# Patient Record
Sex: MALE | Race: WHITE | Employment: FULL TIME | URBAN - METROPOLITAN AREA
[De-identification: names, ages, dates, MRNs, and addresses within clinical notes are randomized per-mention and may not be internally consistent; named-entity substitution may affect disease eponyms.]

---

## 2018-03-12 ENCOUNTER — OFFICE VISIT (OUTPATIENT)
Dept: FAMILY MEDICINE CLINIC | Facility: CLINIC | Age: 28
End: 2018-03-12

## 2018-03-12 VITALS
DIASTOLIC BLOOD PRESSURE: 92 MMHG | SYSTOLIC BLOOD PRESSURE: 153 MMHG | WEIGHT: 205 LBS | HEART RATE: 83 BPM | BODY MASS INDEX: 30 KG/M2

## 2018-03-12 DIAGNOSIS — S89.91XA INJURY OF RIGHT KNEE, INITIAL ENCOUNTER: Primary | ICD-10-CM

## 2018-03-12 PROCEDURE — 99212 OFFICE O/P EST SF 10 MIN: CPT | Performed by: FAMILY MEDICINE

## 2018-03-12 PROCEDURE — 99214 OFFICE O/P EST MOD 30 MIN: CPT | Performed by: FAMILY MEDICINE

## 2018-03-12 RX ORDER — IBUPROFEN 600 MG/1
600 TABLET ORAL EVERY 6 HOURS PRN
Qty: 30 TABLET | Refills: 1 | Status: ON HOLD | OUTPATIENT
Start: 2018-03-12 | End: 2018-05-01

## 2018-03-12 NOTE — PROGRESS NOTES
HPI:    Patient ID: Vijay Ceron is a 34year old male. HPI  Patient presents with: Injury: c/o  right knee injury, pt injured knee playing football yesterday, pt c/o pain and limited movement  No prior injury.   Review of Systems   Musculoskeleta

## 2018-03-14 ENCOUNTER — TELEPHONE (OUTPATIENT)
Dept: FAMILY MEDICINE CLINIC | Facility: CLINIC | Age: 28
End: 2018-03-14

## 2018-03-14 NOTE — TELEPHONE ENCOUNTER
Griselda/MRI Dept/LOM requesting to confirm if pt's knee MRI is with or without contrast    Pt has appt 3/16

## 2018-03-16 ENCOUNTER — HOSPITAL ENCOUNTER (OUTPATIENT)
Dept: MRI IMAGING | Age: 28
Discharge: HOME | End: 2018-03-16
Attending: FAMILY MEDICINE
Payer: COMMERCIAL

## 2018-03-16 DIAGNOSIS — S89.91XA INJURY OF RIGHT KNEE, INITIAL ENCOUNTER: ICD-10-CM

## 2018-03-16 PROCEDURE — 73721 MRI JNT OF LWR EXTRE W/O DYE: CPT | Performed by: FAMILY MEDICINE

## 2018-03-19 ENCOUNTER — OFFICE VISIT (OUTPATIENT)
Dept: ORTHOPEDICS CLINIC | Facility: CLINIC | Age: 28
End: 2018-03-19

## 2018-03-19 DIAGNOSIS — S83.231A COMPLEX TEAR OF MEDIAL MENISCUS OF RIGHT KNEE AS CURRENT INJURY, INITIAL ENCOUNTER: ICD-10-CM

## 2018-03-19 DIAGNOSIS — S83.511A COMPLETE TEAR OF ANTERIOR CRUCIATE LIGAMENT OF RIGHT KNEE, INITIAL ENCOUNTER: Primary | ICD-10-CM

## 2018-03-19 PROCEDURE — 99212 OFFICE O/P EST SF 10 MIN: CPT | Performed by: ORTHOPAEDIC SURGERY

## 2018-03-19 PROCEDURE — 99243 OFF/OP CNSLTJ NEW/EST LOW 30: CPT | Performed by: ORTHOPAEDIC SURGERY

## 2018-03-19 NOTE — H&P
Chief Complaint: Right knee pain    NURSING INTAKE COMMENTS: Patient presents with:   Injury: Right - onset 3/11/18 while playing football his knee gave out sideways and he fell - went home and iced it - wass seen by PCP and has an MRI in the system - still Medial None +++      Lateral None None      Pes anserinus None None      Patellar tendon None None        Kassie Negative Negative             Stability Testing        Anterior Drawer Negative lax      Posterior Drawer Negative Negative      Lachman Neg the following: Anesthetic risk, bleeding, infection, blood clots, pulmonary embolus, knee pain, knee stiffness, and need for further surgery because of retearing. I also discussed the rehabilitation that would be necessary afterwards.   All the patient's q

## 2018-04-18 ENCOUNTER — TELEPHONE (OUTPATIENT)
Dept: ORTHOPEDICS CLINIC | Facility: CLINIC | Age: 28
End: 2018-04-18

## 2018-04-18 NOTE — TELEPHONE ENCOUNTER
Pt for surgery with Dr. Justin [facility]er[facility]   Out patient  May 1 2018  Right knee ACL reconstruction    [id number]  S83.511A    [employer] O    Referrral in system  REferral status authorized.

## 2018-05-01 ENCOUNTER — ANESTHESIA EVENT (OUTPATIENT)
Dept: SURGERY | Facility: HOSPITAL | Age: 28
End: 2018-05-01

## 2018-05-01 ENCOUNTER — SURGERY (OUTPATIENT)
Age: 28
End: 2018-05-01

## 2018-05-01 ENCOUNTER — HOSPITAL ENCOUNTER (OUTPATIENT)
Facility: HOSPITAL | Age: 28
Setting detail: HOSPITAL OUTPATIENT SURGERY
Discharge: HOME | End: 2018-05-01
Attending: ORTHOPAEDIC SURGERY | Admitting: ORTHOPAEDIC SURGERY
Payer: COMMERCIAL

## 2018-05-01 ENCOUNTER — ANESTHESIA (OUTPATIENT)
Dept: SURGERY | Facility: HOSPITAL | Age: 28
End: 2018-05-01

## 2018-05-01 VITALS
HEART RATE: 79 BPM | HEIGHT: 70 IN | OXYGEN SATURATION: 99 % | SYSTOLIC BLOOD PRESSURE: 147 MMHG | BODY MASS INDEX: 28.63 KG/M2 | WEIGHT: 200 LBS | RESPIRATION RATE: 16 BRPM | TEMPERATURE: 98 F | DIASTOLIC BLOOD PRESSURE: 81 MMHG

## 2018-05-01 DIAGNOSIS — S83.281A ACUTE LATERAL MENISCUS TEAR OF RIGHT KNEE, INITIAL ENCOUNTER: Primary | ICD-10-CM

## 2018-05-01 DIAGNOSIS — S83.231A COMPLEX TEAR OF MEDIAL MENISCUS OF RIGHT KNEE AS CURRENT INJURY, INITIAL ENCOUNTER: ICD-10-CM

## 2018-05-01 DIAGNOSIS — S83.511A SPRAIN OF ANTERIOR CRUCIATE LIGAMENT OF RIGHT KNEE, INITIAL ENCOUNTER: ICD-10-CM

## 2018-05-01 PROCEDURE — 76942 ECHO GUIDE FOR BIOPSY: CPT | Performed by: ORTHOPAEDIC SURGERY

## 2018-05-01 PROCEDURE — A4216 STERILE WATER/SALINE, 10 ML: HCPCS

## 2018-05-01 PROCEDURE — 0SBC4ZZ EXCISION OF RIGHT KNEE JOINT, PERCUTANEOUS ENDOSCOPIC APPROACH: ICD-10-PCS | Performed by: ORTHOPAEDIC SURGERY

## 2018-05-01 PROCEDURE — 64447 NJX AA&/STRD FEMORAL NRV IMG: CPT | Performed by: ORTHOPAEDIC SURGERY

## 2018-05-01 PROCEDURE — 3E0T3BZ INTRODUCTION OF ANESTHETIC AGENT INTO PERIPHERAL NERVES AND PLEXI, PERCUTANEOUS APPROACH: ICD-10-PCS | Performed by: ANESTHESIOLOGY

## 2018-05-01 PROCEDURE — 99152 MOD SED SAME PHYS/QHP 5/>YRS: CPT | Performed by: ORTHOPAEDIC SURGERY

## 2018-05-01 PROCEDURE — 0MRN4KZ REPLACEMENT OF RIGHT KNEE BURSA AND LIGAMENT WITH NONAUTOLOGOUS TISSUE SUBSTITUTE, PERCUTANEOUS ENDOSCOPIC APPROACH: ICD-10-PCS | Performed by: ORTHOPAEDIC SURGERY

## 2018-05-01 DEVICE — SCREW INTFR 28MM 9MM RND SHLDR: Type: IMPLANTABLE DEVICE | Site: KNEE | Status: FUNCTIONAL

## 2018-05-01 DEVICE — GRAFT ANTERIOR TIBIALIS: Type: IMPLANTABLE DEVICE | Site: KNEE | Status: FUNCTIONAL

## 2018-05-01 RX ORDER — METOCLOPRAMIDE 10 MG/1
10 TABLET ORAL ONCE
Status: DISCONTINUED | OUTPATIENT
Start: 2018-05-01 | End: 2018-05-01 | Stop reason: HOSPADM

## 2018-05-01 RX ORDER — NALOXONE HYDROCHLORIDE 0.4 MG/ML
80 INJECTION, SOLUTION INTRAMUSCULAR; INTRAVENOUS; SUBCUTANEOUS AS NEEDED
Status: DISCONTINUED | OUTPATIENT
Start: 2018-05-01 | End: 2018-05-01

## 2018-05-01 RX ORDER — CEFAZOLIN SODIUM/WATER 2 G/20 ML
2 SYRINGE (ML) INTRAVENOUS ONCE
Status: DISCONTINUED | OUTPATIENT
Start: 2018-05-01 | End: 2018-05-01 | Stop reason: HOSPADM

## 2018-05-01 RX ORDER — ROPIVACAINE HYDROCHLORIDE 5 MG/ML
INJECTION, SOLUTION EPIDURAL; INFILTRATION; PERINEURAL AS NEEDED
Status: DISCONTINUED | OUTPATIENT
Start: 2018-05-01 | End: 2018-05-01 | Stop reason: SURG

## 2018-05-01 RX ORDER — HYDROMORPHONE HYDROCHLORIDE 1 MG/ML
0.4 INJECTION, SOLUTION INTRAMUSCULAR; INTRAVENOUS; SUBCUTANEOUS EVERY 5 MIN PRN
Status: DISCONTINUED | OUTPATIENT
Start: 2018-05-01 | End: 2018-05-01

## 2018-05-01 RX ORDER — OXYCODONE AND ACETAMINOPHEN 10; 325 MG/1; MG/1
1-2 TABLET ORAL EVERY 6 HOURS PRN
Qty: 40 TABLET | Refills: 0 | Status: SHIPPED | OUTPATIENT
Start: 2018-05-01 | End: 2018-06-27 | Stop reason: ALTCHOICE

## 2018-05-01 RX ORDER — HALOPERIDOL 5 MG/ML
0.25 INJECTION INTRAMUSCULAR ONCE AS NEEDED
Status: DISCONTINUED | OUTPATIENT
Start: 2018-05-01 | End: 2018-05-01

## 2018-05-01 RX ORDER — ONDANSETRON 2 MG/ML
4 INJECTION INTRAMUSCULAR; INTRAVENOUS ONCE AS NEEDED
Status: COMPLETED | OUTPATIENT
Start: 2018-05-01 | End: 2018-05-01

## 2018-05-01 RX ORDER — ONDANSETRON 2 MG/ML
INJECTION INTRAMUSCULAR; INTRAVENOUS AS NEEDED
Status: DISCONTINUED | OUTPATIENT
Start: 2018-05-01 | End: 2018-05-01 | Stop reason: SURG

## 2018-05-01 RX ORDER — HYDROMORPHONE HYDROCHLORIDE 1 MG/ML
0.2 INJECTION, SOLUTION INTRAMUSCULAR; INTRAVENOUS; SUBCUTANEOUS EVERY 5 MIN PRN
Status: DISCONTINUED | OUTPATIENT
Start: 2018-05-01 | End: 2018-05-01

## 2018-05-01 RX ORDER — SODIUM CHLORIDE, SODIUM LACTATE, POTASSIUM CHLORIDE, CALCIUM CHLORIDE 600; 310; 30; 20 MG/100ML; MG/100ML; MG/100ML; MG/100ML
INJECTION, SOLUTION INTRAVENOUS CONTINUOUS
Status: DISCONTINUED | OUTPATIENT
Start: 2018-05-01 | End: 2018-05-01

## 2018-05-01 RX ORDER — ASPIRIN 325 MG
325 TABLET ORAL DAILY
Qty: 14 TABLET | Refills: 0 | Status: SHIPPED | OUTPATIENT
Start: 2018-05-01 | End: 2018-06-27 | Stop reason: ALTCHOICE

## 2018-05-01 RX ORDER — FAMOTIDINE 20 MG/1
20 TABLET ORAL ONCE
Status: DISCONTINUED | OUTPATIENT
Start: 2018-05-01 | End: 2018-05-01 | Stop reason: HOSPADM

## 2018-05-01 RX ORDER — CEFAZOLIN SODIUM/WATER 2 G/20 ML
SYRINGE (ML) INTRAVENOUS AS NEEDED
Status: DISCONTINUED | OUTPATIENT
Start: 2018-05-01 | End: 2018-05-01 | Stop reason: SURG

## 2018-05-01 RX ORDER — HYDROCODONE BITARTRATE AND ACETAMINOPHEN 5; 325 MG/1; MG/1
1 TABLET ORAL AS NEEDED
Status: DISCONTINUED | OUTPATIENT
Start: 2018-05-01 | End: 2018-05-01

## 2018-05-01 RX ORDER — LIDOCAINE HYDROCHLORIDE 10 MG/ML
INJECTION, SOLUTION EPIDURAL; INFILTRATION; INTRACAUDAL; PERINEURAL AS NEEDED
Status: DISCONTINUED | OUTPATIENT
Start: 2018-05-01 | End: 2018-05-01 | Stop reason: SURG

## 2018-05-01 RX ORDER — BUPIVACAINE HYDROCHLORIDE AND EPINEPHRINE 5; 5 MG/ML; UG/ML
INJECTION, SOLUTION PERINEURAL AS NEEDED
Status: DISCONTINUED | OUTPATIENT
Start: 2018-05-01 | End: 2018-05-01 | Stop reason: HOSPADM

## 2018-05-01 RX ORDER — DEXAMETHASONE SODIUM PHOSPHATE 4 MG/ML
VIAL (ML) INJECTION AS NEEDED
Status: DISCONTINUED | OUTPATIENT
Start: 2018-05-01 | End: 2018-05-01 | Stop reason: SURG

## 2018-05-01 RX ORDER — HYDROMORPHONE HYDROCHLORIDE 1 MG/ML
0.6 INJECTION, SOLUTION INTRAMUSCULAR; INTRAVENOUS; SUBCUTANEOUS EVERY 5 MIN PRN
Status: DISCONTINUED | OUTPATIENT
Start: 2018-05-01 | End: 2018-05-01

## 2018-05-01 RX ORDER — ACETAMINOPHEN 500 MG
1000 TABLET ORAL ONCE
Status: COMPLETED | OUTPATIENT
Start: 2018-05-01 | End: 2018-05-01

## 2018-05-01 RX ORDER — HYDROCODONE BITARTRATE AND ACETAMINOPHEN 5; 325 MG/1; MG/1
2 TABLET ORAL AS NEEDED
Status: DISCONTINUED | OUTPATIENT
Start: 2018-05-01 | End: 2018-05-01

## 2018-05-01 RX ORDER — ONDANSETRON 2 MG/ML
4 INJECTION INTRAMUSCULAR; INTRAVENOUS EVERY 6 HOURS PRN
Status: CANCELLED | OUTPATIENT
Start: 2018-05-01

## 2018-05-01 RX ADMIN — SODIUM CHLORIDE, SODIUM LACTATE, POTASSIUM CHLORIDE, CALCIUM CHLORIDE: 600; 310; 30; 20 INJECTION, SOLUTION INTRAVENOUS at 14:51:00

## 2018-05-01 RX ADMIN — SODIUM CHLORIDE, SODIUM LACTATE, POTASSIUM CHLORIDE, CALCIUM CHLORIDE: 600; 310; 30; 20 INJECTION, SOLUTION INTRAVENOUS at 11:10:00

## 2018-05-01 RX ADMIN — ONDANSETRON 4 MG: 2 INJECTION INTRAMUSCULAR; INTRAVENOUS at 14:12:00

## 2018-05-01 RX ADMIN — CEFAZOLIN SODIUM/WATER 2 G: 2 G/20 ML SYRINGE (ML) INTRAVENOUS at 11:30:00

## 2018-05-01 RX ADMIN — DEXAMETHASONE SODIUM PHOSPHATE 4 MG: 4 MG/ML VIAL (ML) INJECTION at 11:33:00

## 2018-05-01 RX ADMIN — SODIUM CHLORIDE, SODIUM LACTATE, POTASSIUM CHLORIDE, CALCIUM CHLORIDE: 600; 310; 30; 20 INJECTION, SOLUTION INTRAVENOUS at 11:55:00

## 2018-05-01 RX ADMIN — SODIUM CHLORIDE, SODIUM LACTATE, POTASSIUM CHLORIDE, CALCIUM CHLORIDE: 600; 310; 30; 20 INJECTION, SOLUTION INTRAVENOUS at 13:40:00

## 2018-05-01 RX ADMIN — LIDOCAINE HYDROCHLORIDE 25 MG: 10 INJECTION, SOLUTION EPIDURAL; INFILTRATION; INTRACAUDAL; PERINEURAL at 11:15:00

## 2018-05-01 RX ADMIN — ROPIVACAINE HYDROCHLORIDE 30 ML: 5 INJECTION, SOLUTION EPIDURAL; INFILTRATION; PERINEURAL at 11:03:00

## 2018-05-01 NOTE — OPERATIVE REPORT
[facility]    PATIENT'S NAME: Fina MATA   ATTENDING PHYSICIAN: Kelsie Lawson MD   OPERATING PHYSICIAN: Kelsie Lawson MD   PATIENT ACCOUNT#:   [id number]    LOCATION:  [address]  MEDICAL RECORD #:   [id number]        Informed consent was obtained. PROCEDURE:  Patient was met in the preoperative holding area and the correct site was identified with his help. He was then brought back to the operating room after an adductor canal block was performed by Anesthesia.   He native femoral footprint of the ACL. We then activated the flipcutting device and retrodrilled a socket to approximately a depth of 25 mm.   In a similar fashion, through an anteromedial incision on the proximal tibia, we retrodrilled our tibial tunnel to for the skin. A sterile dressing was applied. He was placed in a knee immobilizer, awakened, taken to the recovery room in stable condition. FINDINGS:  Grossly torn anterior cruciate ligament.   Large radial tear of the posterior lateral meniscus adjac

## 2018-05-01 NOTE — ANESTHESIA PROCEDURE NOTES
Peripheral Block    Anesthesiologist:  Jackelyn Warner  Patient Location:  PACU  Start Time:  5/1/2018 11:02 AM  End Time:  5/1/2018 11:05 AM  Site Identification: ultrasound guided, real time ultrasound guided and IMAGE STORED AND RETRIEVABLE    Reason fo

## 2018-05-01 NOTE — H&P
Chief Complaint: Right knee pain, instability      History of present illness: This 34year old male presents with an ACL deficient right knee. He already had an MRI which showed an ACL tear that was obtained by Dr. Aki Paulino.   The pain is made worse with           0 Degrees Negative Negative      30 Degrees Negative Negative           Patellar apprehension Negative Negative   Patellofemoral pain Negative Negative                   Patellar Grind test Negative Negative   Hip Motion Normal Normal   Gait limp

## 2018-05-01 NOTE — BRIEF OP NOTE
Pre-Operative Diagnosis: Sprain of anterior cruciate ligament of right knee, initial encounter [S83.511A]  Complex tear of medial meniscus of right knee as current injury, initial encounter [O57.366P]     Post-Operative Diagnosis: Sprain of anterior crucia

## 2018-05-01 NOTE — ANESTHESIA POSTPROCEDURE EVALUATION
Patient: Dacia Musa    Procedure Summary     Date:  05/01/18 Room / Location:  [address] [place] [address] MAIN OR 04 / [address] [place] [address] MAIN OR    Anesthesia Start:  1110 Anesthesia Stop:  0984    Procedure:  KNEE ARTHROSCOPY ACL RECONSTRUCTION (Right Knee) Diagnosis:       Sprain of

## 2018-05-01 NOTE — ANESTHESIA PREPROCEDURE EVALUATION
Anesthesia PreOp Note    HPI:     Nuvia Chang is a 34year old male who presents for preoperative consultation requested by:  Christiane Aguilera MD    Date of Surgery: 5/1/2018    Procedure(s):  KNEE ARTHROSCOPY ACL RECONSTRUCTION  Indication: Spra 05/01/18 1120   EPINEPHrine HCl (ADRENALIN) 1 MG/ML injection   PRN Esvin Mace MD 1 mg at 05/01/18 1146   Lidocaine HCl (XYLOCAINE) 2 % jelly   PRN Lisa Mosley MD 1 Application at 46/41/08 1116     No current Epic-ordered outpatient prescrip Neuro/Psych - negative ROS     GI/Hepatic/Renal - negative ROS     Endo/Other - negative ROS   Abdominal              Anesthesia Plan:   ASA:  1  Plan:   General  Airway:  LMA  Post-op Pain Management: Interscalene block  Plan Comments: I have informed Alexander

## 2018-05-09 ENCOUNTER — OFFICE VISIT (OUTPATIENT)
Dept: PHYSICAL THERAPY | Age: 28
End: 2018-05-09
Attending: FAMILY MEDICINE
Payer: COMMERCIAL

## 2018-05-09 DIAGNOSIS — S83.511A SPRAIN OF ANTERIOR CRUCIATE LIGAMENT OF RIGHT KNEE, INITIAL ENCOUNTER: ICD-10-CM

## 2018-05-09 DIAGNOSIS — S83.281A ACUTE LATERAL MENISCUS TEAR OF RIGHT KNEE, INITIAL ENCOUNTER: ICD-10-CM

## 2018-05-09 PROCEDURE — 97162 PT EVAL MOD COMPLEX 30 MIN: CPT | Performed by: PHYSICAL THERAPIST

## 2018-05-09 PROCEDURE — 97110 THERAPEUTIC EXERCISES: CPT | Performed by: PHYSICAL THERAPIST

## 2018-05-09 NOTE — PROGRESS NOTES
KNEE EVALUATION:   Referring Physician:  Joanna  Diagnosis: Sprain of anterior cruciate ligament of right knee, initial encounter (I15.809V)  Acute lateral meniscus tear of right knee, initial encounter (Z65.731P)      Onset Date: 3/11/18  Sx Date: 5 impairments to return to walking in the community and going up and down stairs.     Precautions:  None     OBJECTIVE:   Gait: Slow antalgic gait with B axillary crutches knee brace locked into extension  Stairs step too gait with 1 rail and crutches    Stre plyometrics including skip/hop/jump on R SL to demo improved R knee stability and strength for age appropriate participation in sport/recreation.   6. Pt to increase R LE strength to 5/5 in order to perform squats without pain      Frequency / Duration: Prem Colon

## 2018-05-14 ENCOUNTER — OFFICE VISIT (OUTPATIENT)
Dept: ORTHOPEDICS CLINIC | Facility: CLINIC | Age: 28
End: 2018-05-14

## 2018-05-14 ENCOUNTER — OFFICE VISIT (OUTPATIENT)
Dept: PHYSICAL THERAPY | Age: 28
End: 2018-05-14
Attending: FAMILY MEDICINE
Payer: COMMERCIAL

## 2018-05-14 DIAGNOSIS — S83.511A RUPTURE OF ANTERIOR CRUCIATE LIGAMENT OF RIGHT KNEE, INITIAL ENCOUNTER: Primary | ICD-10-CM

## 2018-05-14 PROCEDURE — 99024 POSTOP FOLLOW-UP VISIT: CPT | Performed by: ORTHOPAEDIC SURGERY

## 2018-05-14 PROCEDURE — 97110 THERAPEUTIC EXERCISES: CPT | Performed by: PHYSICAL THERAPIST

## 2018-05-14 PROCEDURE — 99212 OFFICE O/P EST SF 10 MIN: CPT | Performed by: ORTHOPAEDIC SURGERY

## 2018-05-14 NOTE — PROGRESS NOTES
Dx: Sprain of anterior cruciate ligament of right knee, initial encounter (Q39.861P)  Acute lateral meniscus tear of right knee, initial encounter (Y05.298I)            Visit # 2  Fall Risk: standard     Scheduled Visits 8  Precautions: see protocol below sport/recreation.   6. Pt to increase R LE strength to 5/5 in order to perform squats without pain            Plan: Cont PT per plan/protocol, advance exercises per protocol next visit    Charges: 3 therex       Total Timed Treatment: 38 min  Total Treatmen obtain full (0 degrees) knee extension. 2. Increase knee range of motion. 3. Increase quadriceps strength in preparation for progression to  ambulation without use of crutches. Exercise Program:  1. Continue knee immobilizer at full extension.  Decrease returning to athletics is that you need three minutes of   biking to substitute for one minute of running. 7. Accelerated program - start with sand bags on tibial tubercle.  Perform  straight leg raises (10 sets, 10 repetitions each) and progress fulcrum (level surfaces) - 15 minutes at 8-10 minutes/mile pace. Add 5  minutes per week. . Perform daily. 2. Biking - by now the amount of set resistance should be increasing. Perform daily at 20 minutes/day. Legs should feel drained once off the  bike.   3. Step program.

## 2018-05-14 NOTE — PROGRESS NOTES
NURSING INTAKE COMMENTS: Patient presents with:  Post-Op: s/p right knee ACL reconstruction & partial lateral meniscectomy      Patient presents 2 weeks status post right knee ACL reconstruction.   Unfortunately not able to do autograft and allograft instea

## 2018-05-16 ENCOUNTER — OFFICE VISIT (OUTPATIENT)
Dept: PHYSICAL THERAPY | Age: 28
End: 2018-05-16
Attending: FAMILY MEDICINE
Payer: COMMERCIAL

## 2018-05-16 PROCEDURE — 97110 THERAPEUTIC EXERCISES: CPT | Performed by: PHYSICAL THERAPIST

## 2018-05-16 NOTE — PROGRESS NOTES
Dx: Sprain of anterior cruciate ligament of right knee, initial encounter (L05.609K)  Acute lateral meniscus tear of right knee, initial encounter (W72.283N)            Visit # 3  Fall Risk: standard     Scheduled Visits 8  Precautions: see protocol below light jogging without increase in pain or deviation to allow patient to safely cross street and participate in sports.   5. Pt to tolerate progression of plyometrics including skip/hop/jump on R SL to demo improved R knee stability and strength for age appr may progress to one crutch (on opposite side) once  quadriceps function and gait mechanics are normal.  3. Discontinue one crutch once gait mechanics are normal (no limping). Postoperative Two to Six Weeks: 5/15/18  Goals:  1.  Top priority - obtain full ( 4  times daily). 4. Calf raises. 3 sets, 10 repetitions - fast and slow sets (each). 5. Swimming. Flutter kick only - gentle. No whip kick. 6. May begin outdoor biking program - avoid hills.  A good rule of thumb  for those interested in returning to [facility] one mile. Build up to one mile by 16 weeks  postoperative. Postoperative Four Months - Six Months:  Goals:  1. Improve quadriceps strength/function  2. Improve endurance  3.  Improve coordination/proprioception  Exercises:  1. Jogging - (level surfaces) - hills and up stairs can be utilized to help build  muscle mass and strength. Care should be taken running downhill and  down steps.  This can irritate the knee and should be one of the last  exercises added to the workout program.

## 2018-05-21 ENCOUNTER — OFFICE VISIT (OUTPATIENT)
Dept: PHYSICAL THERAPY | Age: 28
End: 2018-05-21
Attending: FAMILY MEDICINE
Payer: COMMERCIAL

## 2018-05-21 PROCEDURE — 97110 THERAPEUTIC EXERCISES: CPT | Performed by: PHYSICAL THERAPIST

## 2018-05-21 NOTE — PROGRESS NOTES
Dx: Sprain of anterior cruciate ligament of right knee, initial encounter (S88.503H)  Acute lateral meniscus tear of right knee, initial encounter (U84.047H)            Visit # 4  Fall Risk: standard     Scheduled Visits 8  Precautions: see protocol below locked    Assessment: Trial of gait with knee brace unlocked 0-70deg. Provided minimal verbal cueing for correct mechanics. Pt was instructed that he can ambulate with knee brace unlocked.       HEP:  Knee flexion at wall (handout)    Goals     • Therapy Go Protocol:  1. Gait-weight bear as tolerated with crutches  2.  Exercises (out-of-brace):  Flexion Exercises (4 times daily):  - Active assisted knee flexion (sitting) to > 90 degrees (as tolerated)  - Sitting/standing hip flexion  - Standing hamstring curls effects of the exercise with us. Postoperative Seven to Twelve Weeks:  Goals:  1. Achieve full extension to near full flexion. 2. Improve quadriceps tone (return of VMO definition)   Exercise Program:  1.  Quadriceps - straight leg raises (10 sets of 30 r program.  Exercises:  1. Continue with exercise program from week 7-12.  2. Weight room activities:  - Leg press - press body weight as many times as possible on nonsurgical  side (to fatigue). Follow same sequence on surgical side.   - Squat rack - half sq lots)  - Recreational tennis (no sharp pivoting)  - Golf (9-holes, avoid fatigue)  Postoperative Full Rehabilitation:  1. No competitive or pivot sports until cleared by surgeon.   2. Quadriceps/thigh circumference should be within 1 cm of nonoperative  (if

## 2018-05-23 ENCOUNTER — OFFICE VISIT (OUTPATIENT)
Dept: PHYSICAL THERAPY | Age: 28
End: 2018-05-23
Attending: FAMILY MEDICINE
Payer: COMMERCIAL

## 2018-05-23 PROCEDURE — 97110 THERAPEUTIC EXERCISES: CPT | Performed by: PHYSICAL THERAPIST

## 2018-05-23 NOTE — PROGRESS NOTES
Dx: Sprain of anterior cruciate ligament of right knee, initial encounter (T18.569H)  Acute lateral meniscus tear of right knee, initial encounter (O62.220V)            Visit # 5  Fall Risk: standard     Scheduled Visits 8  Precautions: see protocol below plyometrics including skip/hop/jump on R SL to demo improved R knee stability and strength for age appropriate participation in sport/recreation.   6. Pt to increase R LE strength to 5/5 in order to perform squats without pain            Plan: Cont PT per p crutch once gait mechanics are normal (no limping). Postoperative Two to Six Weeks: 5/15/18  Goals:  1. Top priority - obtain full (0 degrees) knee extension. 2. Increase knee range of motion.   3. Increase quadriceps strength in preparation for progressi No whip kick. 6. May begin outdoor biking program - avoid hills. A good rule of thumb  for those interested in returning to athletics is that you need three minutes of   biking to substitute for one minute of running.   7. Accelerated program - start with quadriceps strength/function  2. Improve endurance  3. Improve coordination/proprioception  Exercises:  1. Jogging - (level surfaces) - 15 minutes at 8-10 minutes/mile pace. Add 5  minutes per week. . Perform daily.   2. Biking - by now the amount of set res and  down steps.  This can irritate the knee and should be one of the last  exercises added to the workout program.

## 2018-06-05 ENCOUNTER — OFFICE VISIT (OUTPATIENT)
Dept: PHYSICAL THERAPY | Age: 28
End: 2018-06-05
Attending: ORTHOPAEDIC SURGERY
Payer: COMMERCIAL

## 2018-06-05 PROCEDURE — 97110 THERAPEUTIC EXERCISES: CPT | Performed by: PHYSICAL THERAPIST

## 2018-06-05 NOTE — PROGRESS NOTES
Dx: Sprain of anterior cruciate ligament of right knee, initial encounter (T67.073C)  Acute lateral meniscus tear of right knee, initial encounter (R39.872Y)            Visit # 6  Fall Risk: standard     Scheduled Visits 8  Precautions: see protocol below independent in his HEP, its’ progression, and management of their symptoms. 2. Pt to ambulate without an assistive device community distance with pain reported no higher than 2/10.    3. Pt will increase R knee AROM equal to that of L knee in order for pat Quadriceps stretch to achieve full passive extension (frequently)  - Quad sets (10 sets of 30 daily)  - Straight leg raises (obtain full extension)  (10 sets of 30 daily). No sag of the knee should be present.   - Hamstring stretches (hourly)  Crutch Ambula and  quads setting (10 sets of 30 repetitions each)  2. Hip muscle groups. May progress by adding weights above the knee. Hip abductors, flexors, abductors, extensors (10 repetitions, 4 sets daily).   An isometric variation can be performed by pushing down at one-half body weight,  10 repetitions; progress to full body weight as tolerated. 3. Continue biking and/or swimming on a daily basis. No whip kicks.   4. Agility workouts:  - Balancing on a penny-totter board or disc on a half-croquet ball  - Figure o strengthening program - independently (2-3 times/week):  - Full speed jog/run - 20-30 minutes - 6-7 minutes/mile or best pace. - Exercise stationary bike - increasing resistance, set bike so low leg is  flexed no more than 10-15 degrees, 20 minutes.   - Ag

## 2018-06-07 ENCOUNTER — OFFICE VISIT (OUTPATIENT)
Dept: PHYSICAL THERAPY | Age: 28
End: 2018-06-07
Attending: FAMILY MEDICINE
Payer: COMMERCIAL

## 2018-06-07 PROCEDURE — 97110 THERAPEUTIC EXERCISES: CPT | Performed by: PHYSICAL THERAPIST

## 2018-06-07 NOTE — PROGRESS NOTES
Dx: Sprain of anterior cruciate ligament of right knee, initial encounter (D84.206G)  Acute lateral meniscus tear of right knee, initial encounter (D69.656I)            Visit # 7  Fall Risk: standard     Scheduled Visits 8  Precautions: see protocol below form with SLR, added gentle stretch prone for knee flexion and added prone hangs. HEP:   Goals     • Therapy Goals            1. Pt to be independent in his HEP, its’ progression, and management of their symptoms.   2. Pt to ambulate without an assist assisted knee flexion (sitting) to > 90 degrees (as tolerated)  - Sitting/standing hip flexion  - Standing hamstring curls  Extension exercises:  - Quadriceps stretch to achieve full passive extension (frequently)  - Quad sets (10 sets of 30 daily)  - Stra flexion. 2. Improve quadriceps tone (return of VMO definition)   Exercise Program:  1. Quadriceps - straight leg raises (10 sets of 30 repetitions each), and  quads setting (10 sets of 30 repetitions each)  2. Hip muscle groups.  May progress by adding Narciso Davies weight as many times as possible on nonsurgical  side (to fatigue). Follow same sequence on surgical side. - Squat rack - half squats (not past 70 degrees) at one-half body weight,  10 repetitions; progress to full body weight as tolerated.   3. Continue b competitive or pivot sports until cleared by surgeon. 2. Quadriceps/thigh circumference should be within 1 cm of nonoperative  (if normal) side.   3. Weekly strengthening program - independently (2-3 times/week):  - Full speed jog/run - 20-30 minutes - 6-7

## 2018-06-11 ENCOUNTER — OFFICE VISIT (OUTPATIENT)
Dept: PHYSICAL THERAPY | Age: 28
End: 2018-06-11
Attending: ORTHOPAEDIC SURGERY
Payer: COMMERCIAL

## 2018-06-11 PROCEDURE — 97110 THERAPEUTIC EXERCISES: CPT | Performed by: PHYSICAL THERAPIST

## 2018-06-11 NOTE — PROGRESS NOTES
Dx: Sprain of anterior cruciate ligament of right knee, initial encounter (D63.440X)  Acute lateral meniscus tear of right knee, initial encounter (T22.950N)            Visit # 7  Fall Risk: standard     Scheduled Visits 8  Precautions: see protocol below 41 min  Total Treatment Time: 41 min    Postoperative Day One:  1. No pillow under knee at any time for first six weeks. Pillows should  always support foot/ankle while in bed. 2. Out of bed  3. Quad sets - 30 reps, 3-5 times daily.  Five quads hard for 6 full extension. Decrease use as  comfortable (important - verify with surgeon). May ambulate without knee  brace (with crutches) once quadriceps able to fire well to support  operative knee.   2. Flexion Exercises:  - Active assisted knee flexion (with over each) and progress fulcrum  distally one inch per week). 8. Walking (level ground). Build up pace gradually. Feel big toe of affected  foot push off as you walk to ensure normal gait pattern. Start off at one  mile at brisk pace, increase to three miles. once off the  bike. 3. Step-ups - face the step. Put foot of operative knee on step and step up  on the step. Repeat with gradual build up in repetitions until doing 100  step-ups/day.  Try to lower from the step twice as long as it takes to raise  up on t

## 2018-06-14 ENCOUNTER — OFFICE VISIT (OUTPATIENT)
Dept: PHYSICAL THERAPY | Age: 28
End: 2018-06-14
Attending: ORTHOPAEDIC SURGERY
Payer: COMMERCIAL

## 2018-06-14 PROCEDURE — 97110 THERAPEUTIC EXERCISES: CPT | Performed by: PHYSICAL THERAPIST

## 2018-06-14 NOTE — PROGRESS NOTES
Patient has HMO and has used / visits requesting 12 more visits  Patient Name: Keeley Duque, : 1990, MRN: [id number]   Date:  2018  Referring Physician:  Pippa Ramirez    Diagnosis: Sprain of anterior cruciate ligament of right k sport/recreation.-not assessed  6.  Pt to increase R LE strength to 5/5 in order to perform squats without pain-ongoing            FOTO: 52% limitation      Rehab Potential: good    Plan: Continue skilled Physical Therapy 2 x/week or a total of 12 visits ov x10    Hamstring stretch on step 30sec x3    Step ups fwd/lat 6 inch x20    QS 5sec x20    Long sitting SLR 2x10    Long sitting SLR c ER 2x10    Prone knee flexion PROM x 5min    Prone knee flexion 5sec x20   Airdyne bckw full revolutions x 10 min    Sage tolerance  6. Dressing changes prior to hospital discharge  7. Obtain full passive extension (0 degrees) out of immobilizer (essential)  8. Achieve 90 degrees of flexion  9. Protected weight bearing as tolerated (WBAT) with crutches  10.  Exercises (out of exercise bike backwards (no resistance).   3. Progressive Resistance Exercises: (30-50 repetitions, 0-5 pounds, 3  times/day)  - Straight leg raises (maintain full extension)  - Hamstring curls  - Hip flexion, extension, abduction  * If any of these exercis side of the door. Feet should be shoulder width apart. Perform a half-squat (never past 90 degrees) and slowly raise to a starting  position. Build up to 100 repetitions per day. Postoperative 12-16 Weeks:  Goals:  1. Full knee range of motion.  Refer kory runs daily - 5 minutes - half-speed - repeat 10-12 repetitions  - Zig-zag running - angle across a distance of 10-15 yards, then angle  back across field to another boundary 10-15 yards apart. Continue for  100 yards.  Tighten up as strength/endurance permi

## 2018-06-18 ENCOUNTER — OFFICE VISIT (OUTPATIENT)
Dept: PHYSICAL THERAPY | Age: 28
End: 2018-06-18
Attending: FAMILY MEDICINE
Payer: COMMERCIAL

## 2018-06-18 PROCEDURE — 97110 THERAPEUTIC EXERCISES: CPT | Performed by: PHYSICAL THERAPIST

## 2018-06-18 NOTE — PROGRESS NOTES
Dx: Sprain of anterior cruciate ligament of right knee, initial encounter (R70.193E)  Acute lateral meniscus tear of right knee, initial encounter (L94.099R)            Visit # 9  Fall Risk: standard     Scheduled Visits 8  Precautions: see protocol guerline assistive device community distance with pain reported no higher than 2/10. -MET  3.  Pt will increase R knee AROM equal to that of L knee in order for patient to be able to navigate stairs with reciprocal pattern and no UE support so he can use the stairs passive extension (frequently)  - Quad sets (10 sets of 30 daily)  - Straight leg raises (obtain full extension)  (10 sets of 30 daily). No sag of the knee should be present.   - Hamstring stretches (hourly)  Crutch Ambulation Protocol (verify with surgeon repetitions each)  2. Hip muscle groups. May progress by adding weights above the knee. Hip abductors, flexors, abductors, extensors (10 repetitions, 4 sets daily).   An isometric variation can be performed by pushing down on the hip being  worked on and s repetitions; progress to full body weight as tolerated. 3. Continue biking and/or swimming on a daily basis. No whip kicks.   4. Agility workouts:  - Balancing on a penny-totter board or disc on a half-croquet ball  - Figure of 8’s (20 to 30 yard diameter independently (2-3 times/week):  - Full speed jog/run - 20-30 minutes - 6-7 minutes/mile or best pace. - Exercise stationary bike - increasing resistance, set bike so low leg is  flexed no more than 10-15 degrees, 20 minutes.   - Agility drills (figure 6’D

## 2018-06-21 ENCOUNTER — OFFICE VISIT (OUTPATIENT)
Dept: PHYSICAL THERAPY | Age: 28
End: 2018-06-21
Attending: FAMILY MEDICINE
Payer: COMMERCIAL

## 2018-06-21 PROCEDURE — 97110 THERAPEUTIC EXERCISES: CPT

## 2018-06-21 NOTE — PROGRESS NOTES
Dx: Sprain of anterior cruciate ligament of right knee, initial encounter (G49.907P)  Acute lateral meniscus tear of right knee, initial encounter (A26.680S)            Visit # 9  Fall Risk: standard     Scheduled Visits 8  Precautions: see protocol guerline light resistance for hip strength ex's today as per current protocol. HEP:   Goals     • Therapy Goals            1. Pt to be independent in his HEP, its’ progression, and management of their symptoms.-ongoing  2.  Pt to ambulate without an assistive de (out-of-brace):  Flexion Exercises (4 times daily):  - Active assisted knee flexion (sitting) to > 90 degrees (as tolerated)  - Sitting/standing hip flexion  - Standing hamstring curls  Extension exercises:  - Quadriceps stretch to achieve full passive ext 6/19/18  Goals:  1. Achieve full extension to near full flexion. 2. Improve quadriceps tone (return of VMO definition)   Exercise Program:  1.  Quadriceps - straight leg raises (10 sets of 30 repetitions each), and  quads setting (10 sets of 30 repetitions 7-12.  2. Weight room activities:  - Leg press - press body weight as many times as possible on nonsurgical  side (to fatigue). Follow same sequence on surgical side.   - Squat rack - half squats (not past 70 degrees) at one-half body weight,  10 repetition avoid fatigue)  Postoperative Full Rehabilitation:  1. No competitive or pivot sports until cleared by surgeon. 2. Quadriceps/thigh circumference should be within 1 cm of nonoperative  (if normal) side.   3. Weekly strengthening program - independently (2-

## 2018-06-22 ENCOUNTER — APPOINTMENT (OUTPATIENT)
Dept: PHYSICAL THERAPY | Age: 28
End: 2018-06-22
Attending: FAMILY MEDICINE
Payer: COMMERCIAL

## 2018-06-25 ENCOUNTER — OFFICE VISIT (OUTPATIENT)
Dept: PHYSICAL THERAPY | Age: 28
End: 2018-06-25
Attending: FAMILY MEDICINE
Payer: COMMERCIAL

## 2018-06-25 PROCEDURE — 97110 THERAPEUTIC EXERCISES: CPT | Performed by: PHYSICAL THERAPIST

## 2018-06-25 NOTE — PROGRESS NOTES
Dx: Sprain of anterior cruciate ligament of right knee, initial encounter (H45.890G)  Acute lateral meniscus tear of right knee, initial encounter (R75.911P)            Visit # 9  Fall Risk: standard     Scheduled Visits 9  Precautions: see protocol guerline pain reported no higher than 2/10. -MET  3. Pt will increase R knee AROM equal to that of L knee in order for patient to be able to navigate stairs with reciprocal pattern and no UE support so he can use the stairs in his home.-ongoing  4.  Pt to tolerate l sets of 30 daily)  - Straight leg raises (obtain full extension)  (10 sets of 30 daily). No sag of the knee should be present. - Hamstring stretches (hourly)  Crutch Ambulation Protocol (verify with surgeon for each case):  1.  Weight bear as tolerated (pr by adding weights above the knee. Hip abductors, flexors, abductors, extensors (10 repetitions, 4 sets daily). An isometric variation can be performed by pushing down on the hip being  worked on and sustaining a contraction for 10 seconds.   3. Hamstrings tolerated. 3. Continue biking and/or swimming on a daily basis. No whip kicks.   4. Agility workouts:  - Balancing on a penny-totter board or disc on a half-croquet ball  - Figure of 8’s (20 to 30 yard diameter circles)  - Backward jog  - Half speed jog ( - 20-30 minutes - 6-7 minutes/mile or best pace. - Exercise stationary bike - increasing resistance, set bike so low leg is  flexed no more than 10-15 degrees, 20 minutes.   - Agility drills (figure 8’s, shuttle runs, turns), penny-totter balancing  - Con

## 2018-06-27 ENCOUNTER — OFFICE VISIT (OUTPATIENT)
Dept: ORTHOPEDICS CLINIC | Facility: CLINIC | Age: 28
End: 2018-06-27

## 2018-06-27 DIAGNOSIS — S83.511A RUPTURE OF ANTERIOR CRUCIATE LIGAMENT OF RIGHT KNEE, INITIAL ENCOUNTER: Primary | ICD-10-CM

## 2018-06-27 PROCEDURE — 99024 POSTOP FOLLOW-UP VISIT: CPT | Performed by: ORTHOPAEDIC SURGERY

## 2018-06-27 PROCEDURE — 99212 OFFICE O/P EST SF 10 MIN: CPT | Performed by: ORTHOPAEDIC SURGERY

## 2018-06-27 NOTE — PROGRESS NOTES
NURSING INTAKE COMMENTS: Patient presents with:  Post-Op: Pt is here for a post operative visit Right ACL repair on 5-1-18. On average pain scale 2-3. Pain intermitten.  Therapy is going well for him      Patient presents 2 months status post right knee AC

## 2018-06-28 ENCOUNTER — OFFICE VISIT (OUTPATIENT)
Dept: PHYSICAL THERAPY | Age: 28
End: 2018-06-28
Attending: FAMILY MEDICINE
Payer: COMMERCIAL

## 2018-06-28 PROCEDURE — 97110 THERAPEUTIC EXERCISES: CPT | Performed by: PHYSICAL THERAPIST

## 2018-06-28 NOTE — PROGRESS NOTES
Dx: Sprain of anterior cruciate ligament of right knee, initial encounter (Y91.355C)  Acute lateral meniscus tear of right knee, initial encounter (M11.825A)            Visit # 13  Fall Risk: standard     Scheduled Visits 10  Precautions: see protocol be x3    Step ups fwd/lat 7 inch x20    Step downs 7 inch x 20    Step down taps to cup 7inch x20     BB taps A/P M/L x20 ea    Standing squats x20    RTB sidestepping 40ft x2    RTB monster walk 40ft x2    Standing hamstring curl 2# 2x20           Assessment of immobilizer):  a. Quad sets  b. Active assisted knee flexion (sitting)  c. Hamstring stretches  d. Passive extension to 0 degrees  e. Standing hamstring curls  Discharge Protocol:  1. Gait-weight bear as tolerated with crutches  2.  Exercises (out-of-bra exercises seem to aggravate the knee (swelling, pain, or  tenderness), then that specific exercise which causes the difficulty should be  postponed until you have discussed the effects of the exercise with us.   Postoperative Seven to Twelve Weeks: 6/19/18 motion. Refer back to surgeon for extension  restriction of 5 degrees or if less than 110 degrees flexion. 2. Normal gait pattern. 3. Progressively increasing functional strengthening program.  Exercises:  1.  Continue with exercise program from week 7-12 strength/endurance permits.   5. Sports on Own:  - Basketball - shooting baskets only  - Rollerblades - level surfaces - no hills- no quick stops, cutting, or  operative leg cross-overs (parking lots)  - Recreational tennis (no sharp pivoting)  - Golf (9-ho

## 2018-07-02 ENCOUNTER — OFFICE VISIT (OUTPATIENT)
Dept: PHYSICAL THERAPY | Age: 28
End: 2018-07-02
Attending: FAMILY MEDICINE
Payer: COMMERCIAL

## 2018-07-02 PROCEDURE — 97110 THERAPEUTIC EXERCISES: CPT | Performed by: PHYSICAL THERAPIST

## 2018-07-02 NOTE — PROGRESS NOTES
Dx: Sprain of anterior cruciate ligament of right knee, initial encounter (B84.460I)  Acute lateral meniscus tear of right knee, initial encounter (J00.852S)            Visit # 14  Fall Risk: standard     Scheduled Visits 10  Precautions: see protocol be appropriate participation in sport/recreation.-not assessed  6. Pt to increase R LE strength to 5/5 in order to perform squats without pain-ongoing              Plan: Cont PT per plan of care.     Charges: 3 therex       Total Timed Treatment: 38 min  Total priority - obtain full (0 degrees) knee extension. 2. Increase knee range of motion. 3. Increase quadriceps strength in preparation for progression to  ambulation without use of crutches. Exercise Program:  1.  Continue knee immobilizer at full extension interested in returning to athletics is that you need three minutes of   biking to substitute for one minute of running. 7. Accelerated program - start with sand bags on tibial tubercle.  Perform  straight leg raises (10 sets, 10 repetitions each) and prog coordination/proprioception  Exercises:  1. Jogging - (level surfaces) - 15 minutes at 8-10 minutes/mile pace. Add 5  minutes per week. . Perform daily. 2. Biking - by now the amount of set resistance should be increasing. Perform daily at 20 minutes/day. the last  exercises added to the workout program.

## 2018-07-05 ENCOUNTER — OFFICE VISIT (OUTPATIENT)
Dept: PHYSICAL THERAPY | Age: 28
End: 2018-07-05
Attending: FAMILY MEDICINE
Payer: COMMERCIAL

## 2018-07-05 PROCEDURE — 97110 THERAPEUTIC EXERCISES: CPT | Performed by: PHYSICAL THERAPIST

## 2018-07-05 NOTE — PROGRESS NOTES
Dx: Sprain of anterior cruciate ligament of right knee, initial encounter (Z51.424G)  Acute lateral meniscus tear of right knee, initial encounter (I71.749C)            Visit # 15  Fall Risk: standard     Scheduled Visits 10  Precautions: see protocol be symptoms.-ongoing  2. Pt to ambulate without an assistive device community distance with pain reported no higher than 2/10. -MET  3.  Pt will increase R knee AROM equal to that of L knee in order for patient to be able to navigate stairs with reciprocal pat achieve full passive extension (frequently)  - Quad sets (10 sets of 30 daily)  - Straight leg raises (obtain full extension)  (10 sets of 30 daily). No sag of the knee should be present.   - Hamstring stretches (hourly)  Crutch Ambulation Protocol (verify sets of 30 repetitions each)  2. Hip muscle groups. May progress by adding weights above the knee. Hip abductors, flexors, abductors, extensors (10 repetitions, 4 sets daily).   An isometric variation can be performed by pushing down on the hip being  work weight,  10 repetitions; progress to full body weight as tolerated. 3. Continue biking and/or swimming on a daily basis. No whip kicks.   4. Agility workouts:  - Balancing on a penny-totter board or disc on a half-croquet ball  - Figure of 8’s (20 to 30 y program - independently (2-3 times/week):  - Full speed jog/run - 20-30 minutes - 6-7 minutes/mile or best pace. - Exercise stationary bike - increasing resistance, set bike so low leg is  flexed no more than 10-15 degrees, 20 minutes.   - Agility drills (

## 2018-07-16 ENCOUNTER — OFFICE VISIT (OUTPATIENT)
Dept: PHYSICAL THERAPY | Age: 28
End: 2018-07-16
Attending: FAMILY MEDICINE
Payer: COMMERCIAL

## 2018-07-16 PROCEDURE — 97110 THERAPEUTIC EXERCISES: CPT | Performed by: PHYSICAL THERAPIST

## 2018-07-16 NOTE — PROGRESS NOTES
Dx: Sprain of anterior cruciate ligament of right knee, initial encounter (K50.369D)  Acute lateral meniscus tear of right knee, initial encounter (D70.231H)            Visit # 15  Fall Risk: standard     Scheduled Visits 10  Precautions: see protocol be raises x30    Eccentric heel raise on steps x20    Knee flexion on stairs 10sec x10    Hamstring stretch on step 30sec x3    Step ups fwd/lat 7 inch x30    Step downs 7 inch x 30    Step down taps to cup 7inch x20     BB taps A/P M/L x20 ea    Standing squ degrees of flexion  9. Protected weight bearing as tolerated (WBAT) with crutches  10. Exercises (out of immobilizer):  a. Quad sets  b. Active assisted knee flexion (sitting)  c. Hamstring stretches  d. Passive extension to 0 degrees  e.  Standing hamstrin (maintain full extension)  - Hamstring curls  - Hip flexion, extension, abduction  * If any of these exercises seem to aggravate the knee (swelling, pain, or  tenderness), then that specific exercise which causes the difficulty should be  postponed until y up to 100 repetitions per day. Postoperative 12-16 Weeks: 7/24/18  Goals:  1. Full knee range of motion. Refer back to surgeon for extension  restriction of 5 degrees or if less than 110 degrees flexion. 2. Normal gait pattern.   3. Progressively increasi across field to another boundary 10-15 yards apart. Continue for  100 yards. Tighten up as strength/endurance permits.   5. Sports on Own:  - Basketball - shooting baskets only  - Rollerblades - level surfaces - no hills- no quick stops, cutting, or  operat

## 2018-07-19 ENCOUNTER — OFFICE VISIT (OUTPATIENT)
Dept: PHYSICAL THERAPY | Age: 28
End: 2018-07-19
Attending: FAMILY MEDICINE
Payer: COMMERCIAL

## 2018-07-19 PROCEDURE — 97110 THERAPEUTIC EXERCISES: CPT | Performed by: PHYSICAL THERAPIST

## 2018-07-19 NOTE — PROGRESS NOTES
Dx: Sprain of anterior cruciate ligament of right knee, initial encounter (P16.083H)  Acute lateral meniscus tear of right knee, initial encounter (P96.066J)            Visit # 17  Fall Risk: standard     Scheduled Visits 10  Precautions: see protocol be knee stability and strength for age appropriate participation in sport/recreation.-not assessed  6. Pt to increase R LE strength to 5/5 in order to perform squats without pain-MET              Plan: Cont PT per plan of care, advance protocol on next visit. limping). Postoperative Two to Six Weeks: 5/15/18  Goals:  1. Top priority - obtain full (0 degrees) knee extension. 2. Increase knee range of motion. 3. Increase quadriceps strength in preparation for progression to  ambulation without use of crutches. outdoor biking program - avoid hills. A good rule of thumb  for those interested in returning to athletics is that you need three minutes of   biking to substitute for one minute of running.   7. Accelerated program - start with sand bags on tibial tubercle strength/function  2. Improve endurance  3. Improve coordination/proprioception  Exercises:  1. Jogging - (level surfaces) - 15 minutes at 8-10 minutes/mile pace. Add 5  minutes per week. . Perform daily.   2. Biking - by now the amount of set resistance [place] steps. This can irritate the knee and should be one of the last  exercises added to the workout program.

## 2018-07-26 ENCOUNTER — OFFICE VISIT (OUTPATIENT)
Dept: PHYSICAL THERAPY | Age: 28
End: 2018-07-26
Attending: FAMILY MEDICINE
Payer: COMMERCIAL

## 2018-07-26 PROCEDURE — 97110 THERAPEUTIC EXERCISES: CPT | Performed by: PHYSICAL THERAPIST

## 2018-07-26 NOTE — PROGRESS NOTES
Dx: Sprain of anterior cruciate ligament of right knee, initial encounter (P72.208F)  Acute lateral meniscus tear of right knee, initial encounter (D08.450S)            Visit # 18  Fall Risk: standard     Scheduled Visits 10  Precautions: see protocol be Goals            1. Pt to be independent in his HEP, its’ progression, and management of their symptoms.-ongoing  2. Pt to ambulate without an assistive device community distance with pain reported no higher than 2/10. -MET  3.  Pt will increase R knee AROM tolerated)  - Sitting/standing hip flexion  - Standing hamstring curls  Extension exercises:  - Quadriceps stretch to achieve full passive extension (frequently)  - Quad sets (10 sets of 30 daily)  - Straight leg raises (obtain full extension)  (10 sets of Exercise Program:  1. Quadriceps - straight leg raises (10 sets of 30 repetitions each), and  quads setting (10 sets of 30 repetitions each)  2. Hip muscle groups. May progress by adding weights above the knee.   Hip abductors, flexors, abductors, extenso fatigue). Follow same sequence on surgical side. - Squat rack - half squats (not past 70 degrees) at one-half body weight,  10 repetitions; progress to full body weight as tolerated. 3. Continue biking and/or swimming on a daily basis. No whip kicks.   4. Quadriceps/thigh circumference should be within 1 cm of nonoperative  (if normal) side. 3. Weekly strengthening program - independently (2-3 times/week):  - Full speed jog/run - 20-30 minutes - 6-7 minutes/mile or best pace.   - Exercise stationary bike -

## 2018-08-02 ENCOUNTER — OFFICE VISIT (OUTPATIENT)
Dept: PHYSICAL THERAPY | Age: 28
End: 2018-08-02
Attending: PHYSICIAN ASSISTANT
Payer: COMMERCIAL

## 2018-08-02 PROCEDURE — 97110 THERAPEUTIC EXERCISES: CPT | Performed by: PHYSICAL THERAPIST

## 2018-08-09 ENCOUNTER — APPOINTMENT (OUTPATIENT)
Dept: PHYSICAL THERAPY | Age: 28
End: 2018-08-09
Attending: PHYSICIAN ASSISTANT
Payer: COMMERCIAL

## 2018-08-09 ENCOUNTER — TELEPHONE (OUTPATIENT)
Dept: PHYSICAL THERAPY | Age: 28
End: 2018-08-09

## 2018-08-16 ENCOUNTER — APPOINTMENT (OUTPATIENT)
Dept: PHYSICAL THERAPY | Age: 28
End: 2018-08-16
Attending: PHYSICIAN ASSISTANT
Payer: COMMERCIAL

## 2018-08-16 ENCOUNTER — TELEPHONE (OUTPATIENT)
Dept: PHYSICAL THERAPY | Age: 28
End: 2018-08-16

## 2018-08-29 ENCOUNTER — OFFICE VISIT (OUTPATIENT)
Dept: ORTHOPEDICS CLINIC | Facility: CLINIC | Age: 28
End: 2018-08-29

## 2018-08-29 DIAGNOSIS — S83.511A RUPTURE OF ANTERIOR CRUCIATE LIGAMENT OF RIGHT KNEE, INITIAL ENCOUNTER: Primary | ICD-10-CM

## 2018-08-29 PROCEDURE — 99212 OFFICE O/P EST SF 10 MIN: CPT | Performed by: ORTHOPAEDIC SURGERY

## 2018-08-29 PROCEDURE — 99213 OFFICE O/P EST LOW 20 MIN: CPT | Performed by: ORTHOPAEDIC SURGERY

## 2018-08-29 NOTE — PROGRESS NOTES
NURSING INTAKE COMMENTS: Patient presents with:  Knee Pain: s/p Right ACL repair f/u - had sx on 5/1/18 - sattes he is good - has some sorness here and there - has swelling by the end of the day - started a new job last week and he stands for 8 hours s Pertinent positives and negatives noted in the the HPI.     Physical Examination:  There is no height or weight on file to calculate BMI., Wt Readings from Last 6 Encounters:  04/24/18 : 200 lb (90.7 kg)  03/12/18 : 205 lb (93 kg)  06/13/16 : 200 lb (90.7 k

## 2018-08-30 ENCOUNTER — OFFICE VISIT (OUTPATIENT)
Dept: PHYSICAL THERAPY | Age: 28
End: 2018-08-30
Attending: PHYSICIAN ASSISTANT
Payer: COMMERCIAL

## 2018-08-30 PROCEDURE — 97110 THERAPEUTIC EXERCISES: CPT | Performed by: PHYSICAL THERAPIST

## 2018-08-30 NOTE — PROGRESS NOTES
Patient Name: Caron Ramos, : 1990, MRN: [id number]   Date:  2018  Referring Physician:  Radha Green    Diagnosis: Sprain of anterior cruciate ligament of right knee, initial encounter (T63.756M)  Acute lateral meniscus tear of limited        Plan: Discharge from Physical therapy    Patient was advised of these findings, precautions, and treatment options and has agreed to actively participate in planning and for this course of care.     Thank you for your referral. If you have an immobilizer (essential)  8. Achieve 90 degrees of flexion  9. Protected weight bearing as tolerated (WBAT) with crutches  10. Exercises (out of immobilizer):  a. Quad sets  b. Active assisted knee flexion (sitting)  c. Hamstring stretches  d.  Passive exten 3  times/day)  - Straight leg raises (maintain full extension)  - Hamstring curls  - Hip flexion, extension, abduction  * If any of these exercises seem to aggravate the knee (swelling, pain, or  tenderness), then that specific exercise which causes the [facility] raise to a starting  position. Build up to 100 repetitions per day. Postoperative 12-16 Weeks: 7/24/18  Goals:  1. Full knee range of motion. Refer back to surgeon for extension  restriction of 5 degrees or if less than 110 degrees flexion.   2. Normal gai of 10-15 yards, then angle  back across field to another boundary 10-15 yards apart. Continue for  100 yards. Tighten up as strength/endurance permits.   5. Sports on Own:  - Basketball - shooting baskets only  - Rollerblades - level surfaces - no hills- no

## 2020-11-17 ENCOUNTER — TELEPHONE (OUTPATIENT)
Dept: FAMILY MEDICINE CLINIC | Facility: CLINIC | Age: 30
End: 2020-11-17

## 2020-11-17 DIAGNOSIS — Z20.828 EXPOSURE TO SARS-ASSOCIATED CORONAVIRUS: Primary | ICD-10-CM

## 2020-11-17 NOTE — TELEPHONE ENCOUNTER
Prior patient of Dr. Sukhwinder Garcia. States that he was exposed to a positive covid on 11/16/20. This morning having cough and diarrhea. Offered virtual visit. Patient declined. Asking for Covid test today. Offered testing sites through [employer].  Celeste Mccoy

## 2020-11-18 ENCOUNTER — APPOINTMENT (OUTPATIENT)
Dept: LAB | Facility: HOSPITAL | Age: 30
End: 2020-11-18
Attending: PHYSICIAN ASSISTANT
Payer: COMMERCIAL

## 2020-11-18 DIAGNOSIS — Z20.828 EXPOSURE TO SARS-ASSOCIATED CORONAVIRUS: ICD-10-CM

## 2023-01-04 ENCOUNTER — OFFICE VISIT (OUTPATIENT)
Dept: INTERNAL MEDICINE CLINIC | Facility: CLINIC | Age: 33
End: 2023-01-04
Payer: COMMERCIAL

## 2023-01-04 VITALS
HEART RATE: 77 BPM | HEIGHT: 70 IN | OXYGEN SATURATION: 98 % | RESPIRATION RATE: 16 BRPM | DIASTOLIC BLOOD PRESSURE: 88 MMHG | BODY MASS INDEX: 30.78 KG/M2 | WEIGHT: 215 LBS | SYSTOLIC BLOOD PRESSURE: 132 MMHG | TEMPERATURE: 98 F

## 2023-01-04 DIAGNOSIS — K42.9 UMBILICAL HERNIA WITHOUT OBSTRUCTION AND WITHOUT GANGRENE: ICD-10-CM

## 2023-01-04 DIAGNOSIS — J35.1 ENLARGED TONSILS: ICD-10-CM

## 2023-01-04 DIAGNOSIS — Z00.00 HEALTH MAINTENANCE EXAMINATION: Primary | ICD-10-CM

## 2023-01-04 DIAGNOSIS — E66.09 CLASS 1 OBESITY DUE TO EXCESS CALORIES WITH SERIOUS COMORBIDITY AND BODY MASS INDEX (BMI) OF 30.0 TO 30.9 IN ADULT: ICD-10-CM

## 2023-01-04 PROBLEM — S83.511A RUPTURE OF ANTERIOR CRUCIATE LIGAMENT OF RIGHT KNEE: Status: RESOLVED | Noted: 2018-05-14 | Resolved: 2023-01-04

## 2023-01-04 PROCEDURE — 99385 PREV VISIT NEW AGE 18-39: CPT | Performed by: FAMILY MEDICINE

## 2023-01-04 PROCEDURE — 3008F BODY MASS INDEX DOCD: CPT | Performed by: FAMILY MEDICINE

## 2023-01-04 PROCEDURE — 90715 TDAP VACCINE 7 YRS/> IM: CPT | Performed by: FAMILY MEDICINE

## 2023-01-04 PROCEDURE — 3075F SYST BP GE 130 - 139MM HG: CPT | Performed by: FAMILY MEDICINE

## 2023-01-04 PROCEDURE — 3079F DIAST BP 80-89 MM HG: CPT | Performed by: FAMILY MEDICINE

## 2023-01-04 PROCEDURE — 90471 IMMUNIZATION ADMIN: CPT | Performed by: FAMILY MEDICINE

## 2023-01-04 NOTE — ASSESSMENT & PLAN NOTE
Exercise and diet advised. CBC, CMP, lipid panel, Hba1c, TSH, HIV screen, hepatitis C screen  Tdap today. Flu shot declined. Advanced directive information provided. Advised COVID vaccine - declined.

## 2023-01-04 NOTE — ASSESSMENT & PLAN NOTE
Reducible umbilical hernia, intermittently causing some mild discomfort  Avoid heavy lifting. Referral to surgery provided.

## 2023-01-04 NOTE — PATIENT INSTRUCTIONS
PATIENT INSTRUCTIONS    Thank you for seeing me today, it was a pleasure taking care of you. Please check out at the  and schedule a follow up appointment. Return in about 1 year (around 1/4/2024) for physical.  Please get your labs drawn - you may need to schedule a lab appointment if this was not completed at your recent doctor's visit. The following imaging studies were ordered: None  Please also follow up with the following specialists: Surgeon - evaluation of your hernia  Please fill out the advance directive information (power of  documents) and bring a copy to our clinic. Portion control - monitor how much you are eating and try to cut back. Do what you can to make healthy choices (as an example, choose water over soda, cut back on snacking). You can try apps such a MyFitnessPal or Weight Watchers to help you monitor your diet and exercise habits. Exercise - increase your intensity level slowly if you do not exercise regularly. Ideally your goal should eventually be to be moving at a pace where it is hard to have a conversation with someone.   Consider COVID vaccine and flu vaccine    Anil,   Dr. Sarwat Kamara

## 2023-01-04 NOTE — ASSESSMENT & PLAN NOTE
Healthy diet and exercise advised. Check labs. Patient is s/p  day# 2  Patient is feeling well and reports no issues.     No complaints.   Continue the current pain medication.   Encourage ambulation and a regular diet.   Plan for discharge today

## 2023-01-23 ENCOUNTER — OFFICE VISIT (OUTPATIENT)
Dept: SURGERY | Facility: CLINIC | Age: 33
End: 2023-01-23

## 2023-01-23 ENCOUNTER — LAB ENCOUNTER (OUTPATIENT)
Dept: LAB | Facility: HOSPITAL | Age: 33
End: 2023-01-23
Attending: FAMILY MEDICINE
Payer: COMMERCIAL

## 2023-01-23 VITALS — WEIGHT: 215 LBS | HEIGHT: 70 IN | BODY MASS INDEX: 30.78 KG/M2

## 2023-01-23 DIAGNOSIS — E66.09 CLASS 1 OBESITY DUE TO EXCESS CALORIES WITH SERIOUS COMORBIDITY AND BODY MASS INDEX (BMI) OF 30.0 TO 30.9 IN ADULT: ICD-10-CM

## 2023-01-23 DIAGNOSIS — K42.9 UMBILICAL HERNIA WITHOUT OBSTRUCTION AND WITHOUT GANGRENE: ICD-10-CM

## 2023-01-23 DIAGNOSIS — Z00.00 HEALTH MAINTENANCE EXAMINATION: ICD-10-CM

## 2023-01-23 DIAGNOSIS — K42.9 UMBILICAL HERNIA WITHOUT OBSTRUCTION AND WITHOUT GANGRENE: Primary | ICD-10-CM

## 2023-01-23 LAB
ALBUMIN SERPL-MCNC: 4.7 G/DL (ref 3.4–5)
ALBUMIN/GLOB SERPL: 1.3 {RATIO} (ref 1–2)
ALP LIVER SERPL-CCNC: 52 U/L
ALT SERPL-CCNC: 25 U/L
ANION GAP SERPL CALC-SCNC: 7 MMOL/L (ref 0–18)
AST SERPL-CCNC: 14 U/L (ref 15–37)
BASOPHILS # BLD AUTO: 0.08 X10(3) UL (ref 0–0.2)
BASOPHILS NFR BLD AUTO: 1.1 %
BILIRUB SERPL-MCNC: 0.8 MG/DL (ref 0.1–2)
BUN BLD-MCNC: 12 MG/DL (ref 7–18)
BUN/CREAT SERPL: 10.5 (ref 10–20)
CALCIUM BLD-MCNC: 9.6 MG/DL (ref 8.5–10.1)
CHLORIDE SERPL-SCNC: 103 MMOL/L (ref 98–112)
CHOLEST SERPL-MCNC: 165 MG/DL (ref ?–200)
CO2 SERPL-SCNC: 28 MMOL/L (ref 21–32)
CREAT BLD-MCNC: 1.14 MG/DL
DEPRECATED RDW RBC AUTO: 41.1 FL (ref 35.1–46.3)
EOSINOPHIL # BLD AUTO: 0.19 X10(3) UL (ref 0–0.7)
EOSINOPHIL NFR BLD AUTO: 2.6 %
ERYTHROCYTE [DISTWIDTH] IN BLOOD BY AUTOMATED COUNT: 12 % (ref 11–15)
EST. AVERAGE GLUCOSE BLD GHB EST-MCNC: 103 MG/DL (ref 68–126)
FASTING PATIENT LIPID ANSWER: YES
FASTING STATUS PATIENT QL REPORTED: YES
GFR SERPLBLD BASED ON 1.73 SQ M-ARVRAT: 89 ML/MIN/1.73M2 (ref 60–?)
GLOBULIN PLAS-MCNC: 3.5 G/DL (ref 2.8–4.4)
GLUCOSE BLD-MCNC: 93 MG/DL (ref 70–99)
HBA1C MFR BLD: 5.2 % (ref ?–5.7)
HCT VFR BLD AUTO: 45.7 %
HCV AB SERPL QL IA: NONREACTIVE
HDLC SERPL-MCNC: 72 MG/DL (ref 40–59)
HGB BLD-MCNC: 15.5 G/DL
IMM GRANULOCYTES # BLD AUTO: 0.02 X10(3) UL (ref 0–1)
IMM GRANULOCYTES NFR BLD: 0.3 %
LDLC SERPL CALC-MCNC: 75 MG/DL (ref ?–100)
LYMPHOCYTES # BLD AUTO: 2.87 X10(3) UL (ref 1–4)
LYMPHOCYTES NFR BLD AUTO: 38.9 %
MCH RBC QN AUTO: 31.3 PG (ref 26–34)
MCHC RBC AUTO-ENTMCNC: 33.9 G/DL (ref 31–37)
MCV RBC AUTO: 92.1 FL
MONOCYTES # BLD AUTO: 0.6 X10(3) UL (ref 0.1–1)
MONOCYTES NFR BLD AUTO: 8.1 %
NEUTROPHILS # BLD AUTO: 3.62 X10 (3) UL (ref 1.5–7.7)
NEUTROPHILS # BLD AUTO: 3.62 X10(3) UL (ref 1.5–7.7)
NEUTROPHILS NFR BLD AUTO: 49 %
NONHDLC SERPL-MCNC: 93 MG/DL (ref ?–130)
OSMOLALITY SERPL CALC.SUM OF ELEC: 285 MOSM/KG (ref 275–295)
PLATELET # BLD AUTO: 291 10(3)UL (ref 150–450)
POTASSIUM SERPL-SCNC: 4.1 MMOL/L (ref 3.5–5.1)
PROT SERPL-MCNC: 8.2 G/DL (ref 6.4–8.2)
RBC # BLD AUTO: 4.96 X10(6)UL
SODIUM SERPL-SCNC: 138 MMOL/L (ref 136–145)
TRIGL SERPL-MCNC: 102 MG/DL (ref 30–149)
TSI SER-ACNC: 1.48 MIU/ML (ref 0.36–3.74)
VLDLC SERPL CALC-MCNC: 16 MG/DL (ref 0–30)
WBC # BLD AUTO: 7.4 X10(3) UL (ref 4–11)

## 2023-01-23 PROCEDURE — 80053 COMPREHEN METABOLIC PANEL: CPT

## 2023-01-23 PROCEDURE — 84443 ASSAY THYROID STIM HORMONE: CPT

## 2023-01-23 PROCEDURE — 83036 HEMOGLOBIN GLYCOSYLATED A1C: CPT

## 2023-01-23 PROCEDURE — 87389 HIV-1 AG W/HIV-1&-2 AB AG IA: CPT

## 2023-01-23 PROCEDURE — 85025 COMPLETE CBC W/AUTO DIFF WBC: CPT

## 2023-01-23 PROCEDURE — 99203 OFFICE O/P NEW LOW 30 MIN: CPT | Performed by: SURGERY

## 2023-01-23 PROCEDURE — 80061 LIPID PANEL: CPT

## 2023-01-23 PROCEDURE — 3008F BODY MASS INDEX DOCD: CPT | Performed by: SURGERY

## 2023-01-23 PROCEDURE — 86803 HEPATITIS C AB TEST: CPT

## 2023-01-23 PROCEDURE — 36415 COLL VENOUS BLD VENIPUNCTURE: CPT

## 2023-01-23 NOTE — PATIENT INSTRUCTIONS
Obtain preoperative testing    Plan laparoscopic repair of umbilical hernia with mesh at [facility]    Same-day surgery will call the day before with instructions    Avoid aspirin and NSAIDs for 5 days prior to surgery

## 2023-02-07 ENCOUNTER — LAB ENCOUNTER (OUTPATIENT)
Dept: LAB | Age: 33
End: 2023-02-07
Attending: SURGERY
Payer: COMMERCIAL

## 2023-02-07 DIAGNOSIS — Z01.818 PREOP TESTING: ICD-10-CM

## 2023-02-08 LAB — SARS-COV-2 RNA RESP QL NAA+PROBE: NOT DETECTED

## 2023-02-09 ENCOUNTER — ANESTHESIA EVENT (OUTPATIENT)
Dept: SURGERY | Facility: HOSPITAL | Age: 33
End: 2023-02-09
Payer: COMMERCIAL

## 2023-02-09 ENCOUNTER — ANESTHESIA (OUTPATIENT)
Dept: SURGERY | Facility: HOSPITAL | Age: 33
End: 2023-02-09
Payer: COMMERCIAL

## 2023-02-09 ENCOUNTER — HOSPITAL ENCOUNTER (OUTPATIENT)
Facility: HOSPITAL | Age: 33
Setting detail: HOSPITAL OUTPATIENT SURGERY
Discharge: HOME | End: 2023-02-09
Attending: SURGERY | Admitting: SURGERY
Payer: COMMERCIAL

## 2023-02-09 VITALS
OXYGEN SATURATION: 94 % | DIASTOLIC BLOOD PRESSURE: 82 MMHG | HEIGHT: 70 IN | HEART RATE: 64 BPM | BODY MASS INDEX: 30.78 KG/M2 | SYSTOLIC BLOOD PRESSURE: 142 MMHG | RESPIRATION RATE: 18 BRPM | WEIGHT: 215 LBS | TEMPERATURE: 97 F

## 2023-02-09 DIAGNOSIS — K42.9 UMBILICAL HERNIA WITHOUT OBSTRUCTION AND WITHOUT GANGRENE: ICD-10-CM

## 2023-02-09 DIAGNOSIS — Z01.818 PREOP TESTING: Primary | ICD-10-CM

## 2023-02-09 PROCEDURE — 11401 EXC TR-EXT B9+MARG 0.6-1 CM: CPT | Performed by: SURGERY

## 2023-02-09 PROCEDURE — 49591 RPR AA HRN 1ST < 3 CM RDC: CPT | Performed by: SURGERY

## 2023-02-09 PROCEDURE — 0WUF4JZ SUPPLEMENT ABDOMINAL WALL WITH SYNTHETIC SUBSTITUTE, PERCUTANEOUS ENDOSCOPIC APPROACH: ICD-10-PCS | Performed by: SURGERY

## 2023-02-09 DEVICE — VENTRALIGHT ST MESH WITH ECHO PS POSITONING SYSTEM
Type: IMPLANTABLE DEVICE | Site: ABDOMEN | Status: FUNCTIONAL
Brand: VENTRALIGHT ST MESH WITH ECHO PS POSITONING SYSTEM

## 2023-02-09 RX ORDER — ONDANSETRON 2 MG/ML
INJECTION INTRAMUSCULAR; INTRAVENOUS AS NEEDED
Status: DISCONTINUED | OUTPATIENT
Start: 2023-02-09 | End: 2023-02-09 | Stop reason: SURG

## 2023-02-09 RX ORDER — HYDROCODONE BITARTRATE AND ACETAMINOPHEN 5; 325 MG/1; MG/1
1 TABLET ORAL EVERY 6 HOURS PRN
Qty: 15 TABLET | Refills: 0 | Status: SHIPPED | OUTPATIENT
Start: 2023-02-09

## 2023-02-09 RX ORDER — MORPHINE SULFATE 4 MG/ML
2 INJECTION, SOLUTION INTRAMUSCULAR; INTRAVENOUS EVERY 10 MIN PRN
Status: DISCONTINUED | OUTPATIENT
Start: 2023-02-09 | End: 2023-02-09

## 2023-02-09 RX ORDER — NALOXONE HYDROCHLORIDE 0.4 MG/ML
80 INJECTION, SOLUTION INTRAMUSCULAR; INTRAVENOUS; SUBCUTANEOUS AS NEEDED
Status: DISCONTINUED | OUTPATIENT
Start: 2023-02-09 | End: 2023-02-09

## 2023-02-09 RX ORDER — HYDROMORPHONE HYDROCHLORIDE 1 MG/ML
0.6 INJECTION, SOLUTION INTRAMUSCULAR; INTRAVENOUS; SUBCUTANEOUS EVERY 5 MIN PRN
Status: DISCONTINUED | OUTPATIENT
Start: 2023-02-09 | End: 2023-02-09

## 2023-02-09 RX ORDER — DEXAMETHASONE SODIUM PHOSPHATE 4 MG/ML
VIAL (ML) INJECTION AS NEEDED
Status: DISCONTINUED | OUTPATIENT
Start: 2023-02-09 | End: 2023-02-09 | Stop reason: SURG

## 2023-02-09 RX ORDER — SODIUM CHLORIDE, SODIUM LACTATE, POTASSIUM CHLORIDE, CALCIUM CHLORIDE 600; 310; 30; 20 MG/100ML; MG/100ML; MG/100ML; MG/100ML
INJECTION, SOLUTION INTRAVENOUS CONTINUOUS
Status: DISCONTINUED | OUTPATIENT
Start: 2023-02-09 | End: 2023-02-09

## 2023-02-09 RX ORDER — ACETAMINOPHEN 500 MG
1000 TABLET ORAL ONCE
Status: COMPLETED | OUTPATIENT
Start: 2023-02-09 | End: 2023-02-09

## 2023-02-09 RX ORDER — HYDROCODONE BITARTRATE AND ACETAMINOPHEN 5; 325 MG/1; MG/1
1 TABLET ORAL ONCE
Status: COMPLETED | OUTPATIENT
Start: 2023-02-09 | End: 2023-02-09

## 2023-02-09 RX ORDER — HYDROMORPHONE HYDROCHLORIDE 1 MG/ML
0.2 INJECTION, SOLUTION INTRAMUSCULAR; INTRAVENOUS; SUBCUTANEOUS EVERY 5 MIN PRN
Status: DISCONTINUED | OUTPATIENT
Start: 2023-02-09 | End: 2023-02-09

## 2023-02-09 RX ORDER — LIDOCAINE HYDROCHLORIDE 10 MG/ML
INJECTION, SOLUTION EPIDURAL; INFILTRATION; INTRACAUDAL; PERINEURAL AS NEEDED
Status: DISCONTINUED | OUTPATIENT
Start: 2023-02-09 | End: 2023-02-09 | Stop reason: SURG

## 2023-02-09 RX ORDER — MORPHINE SULFATE 4 MG/ML
4 INJECTION, SOLUTION INTRAMUSCULAR; INTRAVENOUS EVERY 10 MIN PRN
Status: DISCONTINUED | OUTPATIENT
Start: 2023-02-09 | End: 2023-02-09

## 2023-02-09 RX ORDER — MORPHINE SULFATE 10 MG/ML
6 INJECTION, SOLUTION INTRAMUSCULAR; INTRAVENOUS EVERY 10 MIN PRN
Status: DISCONTINUED | OUTPATIENT
Start: 2023-02-09 | End: 2023-02-09

## 2023-02-09 RX ORDER — GLYCOPYRROLATE 0.2 MG/ML
INJECTION, SOLUTION INTRAMUSCULAR; INTRAVENOUS AS NEEDED
Status: DISCONTINUED | OUTPATIENT
Start: 2023-02-09 | End: 2023-02-09 | Stop reason: SURG

## 2023-02-09 RX ORDER — HYDROMORPHONE HYDROCHLORIDE 1 MG/ML
0.4 INJECTION, SOLUTION INTRAMUSCULAR; INTRAVENOUS; SUBCUTANEOUS EVERY 5 MIN PRN
Status: DISCONTINUED | OUTPATIENT
Start: 2023-02-09 | End: 2023-02-09

## 2023-02-09 RX ORDER — METOCLOPRAMIDE 10 MG/1
10 TABLET ORAL ONCE
Status: COMPLETED | OUTPATIENT
Start: 2023-02-09 | End: 2023-02-09

## 2023-02-09 RX ORDER — ROCURONIUM BROMIDE 10 MG/ML
INJECTION, SOLUTION INTRAVENOUS AS NEEDED
Status: DISCONTINUED | OUTPATIENT
Start: 2023-02-09 | End: 2023-02-09 | Stop reason: SURG

## 2023-02-09 RX ORDER — FAMOTIDINE 20 MG/1
20 TABLET, FILM COATED ORAL ONCE
Status: COMPLETED | OUTPATIENT
Start: 2023-02-09 | End: 2023-02-09

## 2023-02-09 RX ORDER — NEOSTIGMINE METHYLSULFATE 1 MG/ML
INJECTION, SOLUTION INTRAVENOUS AS NEEDED
Status: DISCONTINUED | OUTPATIENT
Start: 2023-02-09 | End: 2023-02-09 | Stop reason: SURG

## 2023-02-09 RX ORDER — CEFAZOLIN SODIUM/WATER 2 G/20 ML
2 SYRINGE (ML) INTRAVENOUS ONCE
Status: COMPLETED | OUTPATIENT
Start: 2023-02-09 | End: 2023-02-09

## 2023-02-09 RX ORDER — IBUPROFEN 600 MG/1
600 TABLET ORAL EVERY 6 HOURS PRN
Qty: 15 TABLET | Refills: 1 | Status: SHIPPED | OUTPATIENT
Start: 2023-02-09 | End: 2023-02-16

## 2023-02-09 RX ORDER — BUPIVACAINE HYDROCHLORIDE AND EPINEPHRINE 2.5; 5 MG/ML; UG/ML
INJECTION, SOLUTION INFILTRATION; PERINEURAL AS NEEDED
Status: DISCONTINUED | OUTPATIENT
Start: 2023-02-09 | End: 2023-02-09 | Stop reason: HOSPADM

## 2023-02-09 RX ADMIN — ROCURONIUM BROMIDE 50 MG: 10 INJECTION, SOLUTION INTRAVENOUS at 11:39:00

## 2023-02-09 RX ADMIN — LIDOCAINE HYDROCHLORIDE 25 MG: 10 INJECTION, SOLUTION EPIDURAL; INFILTRATION; INTRACAUDAL; PERINEURAL at 11:39:00

## 2023-02-09 RX ADMIN — GLYCOPYRROLATE 1 MG: 0.2 INJECTION, SOLUTION INTRAMUSCULAR; INTRAVENOUS at 12:07:00

## 2023-02-09 RX ADMIN — ONDANSETRON 4 MG: 2 INJECTION INTRAMUSCULAR; INTRAVENOUS at 11:36:00

## 2023-02-09 RX ADMIN — NEOSTIGMINE METHYLSULFATE 5 MG: 1 INJECTION, SOLUTION INTRAVENOUS at 12:07:00

## 2023-02-09 RX ADMIN — GLYCOPYRROLATE 0.2 MG: 0.2 INJECTION, SOLUTION INTRAMUSCULAR; INTRAVENOUS at 11:36:00

## 2023-02-09 RX ADMIN — SODIUM CHLORIDE, SODIUM LACTATE, POTASSIUM CHLORIDE, CALCIUM CHLORIDE: 600; 310; 30; 20 INJECTION, SOLUTION INTRAVENOUS at 12:13:00

## 2023-02-09 RX ADMIN — SODIUM CHLORIDE, SODIUM LACTATE, POTASSIUM CHLORIDE, CALCIUM CHLORIDE: 600; 310; 30; 20 INJECTION, SOLUTION INTRAVENOUS at 11:34:00

## 2023-02-09 RX ADMIN — CEFAZOLIN SODIUM/WATER 2 G: 2 G/20 ML SYRINGE (ML) INTRAVENOUS at 11:45:00

## 2023-02-09 RX ADMIN — DEXAMETHASONE SODIUM PHOSPHATE 4 MG: 4 MG/ML VIAL (ML) INJECTION at 11:36:00

## 2023-02-09 NOTE — OPERATIVE REPORT
[facility]    PATIENT'S NAME: Ady Tovar   ATTENDING PHYSICIAN: Caro Corral MD   OPERATING PHYSICIAN: Caro Corral MD   PATIENT ACCOUNT#:   [de-identified]    LOCATION:  [facility] PACU [address] [place] [address]  MEDICAL RECORD #:   [id number]       YOB: 1990  ADMISSION DATE:       02/09/2023      OPERATION DATE:  02/09/2023    OPERATIVE REPORT      PREOPERATIVE DIAGNOSIS:  Umbilical hernia. POSTOPERATIVE DIAGNOSIS:  Umbilical hernia, atypical umbilical mole. PROCEDURE:  Umbilical hernia repair with mesh, excision umbilical mole. ASSISTANT:  MEAGHAN Martin    ESTIMATED BLOOD LOSS:  2 mL. COMPLICATIONS:  None. ANESTHESIA:  General.    DISPOSITION:  To recovery tolerated well. INDICATIONS:  Patient is 34 with a symptomatic umbilical hernia. Consent obtained. OPERATIVE TECHNIQUE:  He was taken to surgery. He is prepped and draped in the usual sterile fashion. Veress needle placed at [place] point. Abdomen is insufflated. A 5 mm trocar placed using Optiview, and additional 5 and 12 were placed under laparoscopic guidance. Exploration reveals incarcerated preperitoneal fat within a umbilical defect measuring approximately 1 cm in size. The fat is removed using sharp dissection. Hemostasis assured. There was a peculiar mole at the base of the umbilicus, which is excised. Using the same incision, the needle passer is placed and the defect closed primarily using 0 Vicryl. A 4.5 inch Ventralight is rolled and placed intra-abdominally, the balloon deployed and secured in place using double-crown technique of secure strap. The balloon retrieved. Hemostasis assured. Trocars are removed. The 12 mm site closed with 0 Vicryl. Skin closed with 3-0 Monocryl, benzoin, and Steri-Strips. The mole measured 8 mm in size.     Dictated By Caro Corral MD  d: 02/09/2023 12:19:45  t: 02/09/2023 14:14:31  Job [id number]  UE/    cc: Austin Mcnamara MD

## 2023-02-09 NOTE — INTERVAL H&P NOTE
Pre-op Diagnosis: Umbilical hernia without obstruction and without gangrene [K42.9]    The above referenced H&P was reviewed by Manny Cox MD on 2/9/2023, the patient was examined and no significant changes have occurred in the patient's condition since the H&P was performed. I discussed with the patient and/or legal representative the potential benefits, risks and side effects of this procedure; the likelihood of the patient achieving goals; and potential problems that might occur during recuperation. I discussed reasonable alternatives to the procedure, including risks, benefits and side effects related to the alternatives and risks related to not receiving this procedure. We will proceed with procedure as planned.

## 2023-02-09 NOTE — ANESTHESIA PROCEDURE NOTES
Airway  Date/Time: 2/9/2023 11:41 AM  Urgency: Elective      General Information and Staff    Patient location during procedure: OR  Anesthesiologist: Paty Reyes MD  Performed: anesthesiologist     Indications and Patient Condition  Indications for airway management: anesthesia  Sedation level: deep  Preoxygenated: yes  Patient position: sniffing  Mask difficulty assessment: 1 - vent by mask    Final Airway Details  Final airway type: endotracheal airway      Successful airway: ETT  Cuffed: yes   Successful intubation technique: direct laryngoscopy  Endotracheal tube insertion site: oral  Blade: [place]  Blade size: #3  ETT size (mm): 7.5    Cormack-Lehane Classification: grade I - full view of glottis  Placement verified by: chest auscultation and capnometry   Measured from: lips  ETT to lips (cm): 24  Number of attempts at approach: 1  Number of other approaches attempted: 0    Additional Comments  Easy mask and easy VC visualizaiton

## 2023-03-15 ENCOUNTER — OFFICE VISIT (OUTPATIENT)
Dept: SURGERY | Facility: CLINIC | Age: 33
End: 2023-03-15

## 2023-03-15 DIAGNOSIS — Z98.890 POST-OPERATIVE STATE: Primary | ICD-10-CM

## 2023-03-15 PROCEDURE — 99024 POSTOP FOLLOW-UP VISIT: CPT | Performed by: SURGERY

## 2023-03-15 NOTE — PROGRESS NOTES
Postoperative Patient Follow-up      3/15/2023    HPI  Patient presents with:  Post-Op: Pt here for post op s/p [facility] repair of umbilical hernia on 2/0/13. Pt denies fever, dif. W/ bm's or urination.        Roosevelt Adkins is a 34year old male postop after laparoscopic umbilical hernia doing well      Exam  Incisions are clean dry intact      Assessment/Plan  Assessment   Post-operative state  (primary encounter diagnosis)    Follow-up for problems         Sheeba Francisco MD

## (undated) DEVICE — 3M™ STERI-STRIP™ REINFORCED ADHESIVE SKIN CLOSURES, R1547, 1/2 IN X 4 IN (12 MM X 100 MM), 6 STRIPS/ENVELOPE: Brand: 3M™ STERI-STRIP™

## (undated) DEVICE — SOL  .9 3000ML

## (undated) DEVICE — TUBING IRR 16FT CNT WV 3 ASCP

## (undated) DEVICE — INSUFFLATION NEEDLE TO ESTABLISH PNEUMOPERITONEUM.: Brand: INSUFFLATION NEEDLE

## (undated) DEVICE — GAMMEX® PI HYBRID SIZE 7, STERILE POWDER-FREE SURGICAL GLOVE, POLYISOPRENE AND NEOPRENE BLEND: Brand: GAMMEX

## (undated) DEVICE — DRAPE SHEET LG

## (undated) DEVICE — SUCTION CANISTER, 3000CC,SAFELINER: Brand: DEROYAL

## (undated) DEVICE — DRAPE SRG 90X60IN BCK TBL CVR

## (undated) DEVICE — BLADE SHVR COOLCUT 13CM 4MM

## (undated) DEVICE — SOL NACL IRRIG 0.9% 1000ML BTL

## (undated) DEVICE — NEEDLE HPO 18GA 1.5IN ECLPS

## (undated) DEVICE — 60 ML SYRINGE LUER-LOCK TIP: Brand: MONOJECT

## (undated) DEVICE — POLAR CARE CUBE COOLING UNIT

## (undated) DEVICE — Device

## (undated) DEVICE — AMBIENT SUPER TURBOVAC 90 IFS: Brand: COBLATION

## (undated) DEVICE — COTTON UNDERCAST PADDING,REGULAR FINISH: Brand: WEBRIL

## (undated) DEVICE — SUTURE PASSOR WITH GUIDE

## (undated) DEVICE — LAP CHOLE: Brand: MEDLINE INDUSTRIES, INC.

## (undated) DEVICE — DECANTER SPIKE TRANSFLOW

## (undated) DEVICE — Device: Brand: JELCO

## (undated) DEVICE — WEBRIL COTTON UNDERCAST PADDING: Brand: WEBRIL

## (undated) DEVICE — SUT MONOCRYL 3-0 PS-2 Y497G

## (undated) DEVICE — SUTURE VICRYL 2-0 FS-1

## (undated) DEVICE — ZIMMER® STERILE DISPOSABLE TOURNIQUET CUFF WITH PLC, DUAL PORT, SINGLE BLADDER, 30 IN. (76 CM)

## (undated) DEVICE — [HIGH FLOW INSUFFLATOR,  DO NOT USE IF PACKAGE IS DAMAGED,  KEEP DRY,  KEEP AWAY FROM SUNLIGHT,  PROTECT FROM HEAT AND RADIOACTIVE SOURCES.]: Brand: PNEUMOSURE

## (undated) DEVICE — MEDI-VAC NON-CONDUCTIVE SUCTION TUBING: Brand: CARDINAL HEALTH

## (undated) DEVICE — SUTURE FIBERWIRE S AR-7200

## (undated) DEVICE — KIT ASCP FX ALL INSD ACL STRL

## (undated) DEVICE — TOWEL OR BLU 16X26 STRL

## (undated) DEVICE — SUTURE ETHILON 3-0 669H

## (undated) DEVICE — TROCAR: Brand: KII FIOS FIRST ENTRY

## (undated) DEVICE — BRACE ORTH LNG LG 26IN 22-27IN

## (undated) DEVICE — PAD THRP 16IN WRPON MU LNG STM

## (undated) DEVICE — TROCAR: Brand: KII SHIELDED BLADED ACCESS SYSTEM

## (undated) DEVICE — SUTURE VICRYL 0 CP-1

## (undated) DEVICE — LOWER EXTREMITY: Brand: MEDLINE INDUSTRIES, INC.

## (undated) DEVICE — ABDOMINAL PAD: Brand: CURITY

## (undated) DEVICE — KIT ACL TRANS TIB HALL SAW BLD

## (undated) DEVICE — BATTERY

## (undated) DEVICE — ADHESIVE MASTISOL 2/3ML

## (undated) DEVICE — TROCAR: Brand: KII® SLEEVE

## (undated) DEVICE — NEEDLE SPINAL 18X3-1/2 PINK.

## (undated) DEVICE — SPONGE LAP 18X18 XRAY STRL

## (undated) DEVICE — DEVICE FX TGHTRP BN TNDN BN: Type: IMPLANTABLE DEVICE | Site: KNEE | Status: NON-FUNCTIONAL

## (undated) DEVICE — KIT BIO TEND AR-1676DS

## (undated) DEVICE — DEVICE FIX.SECURESTRAP 5MM

## (undated) DEVICE — STERILE LATEX POWDER-FREE SURGICAL GLOVESWITH NITRILE COATING: Brand: PROTEXIS

## (undated) DEVICE — STERILE TETRA-FLEX CF, ELASTIC BANDAGE, 4" X 5.5YD: Brand: TETRA-FLEX™CF

## (undated) NOTE — LETTER
Dear Dr. Shubham Mclean    This letter is to inform you that Gabbie Garibay has been attending Physical Therapy with me. See below for my most recent plan of care.        Patient Name: Gabbie Garibay : 1990, MRN: [id number]   Date:  8/3 SL to demo improved R knee stability and strength for age appropriate participation in sport/recreation. -MET  6.  Pt to increase R LE strength to 5/5 in order to perform squats without pain-MET            FOTO: 27% limited        Plan: Discharge from [address] [place] [address]